# Patient Record
Sex: MALE | Race: WHITE | URBAN - METROPOLITAN AREA
[De-identification: names, ages, dates, MRNs, and addresses within clinical notes are randomized per-mention and may not be internally consistent; named-entity substitution may affect disease eponyms.]

---

## 2019-07-29 ENCOUNTER — HISTORICAL (OUTPATIENT)
Dept: ADMINISTRATIVE | Facility: HOSPITAL | Age: 24
End: 2019-07-29

## 2019-08-08 ENCOUNTER — HISTORICAL (OUTPATIENT)
Dept: SURGERY | Facility: HOSPITAL | Age: 24
End: 2019-08-08

## 2020-03-12 ENCOUNTER — HISTORICAL (OUTPATIENT)
Dept: SURGERY | Facility: HOSPITAL | Age: 25
End: 2020-03-12

## 2022-04-30 NOTE — OP NOTE
DATE OF SURGERY:    08/08/2019    SURGEON:  Valentin Ashford MD    PREOPERATIVE DIAGNOSIS:  Partial amputation, right finger, status post local flap with ischemia and necrosis.    POSTOPERATIVE DIAGNOSIS:  Partial amputation, right finger, status post local flap with ischemia and necrosis.    PROCEDURES:    1. Debridement of nonviable local flap to right distal thumb.  2. Irrigation and debridement, open fracture site right distal phalanx.  3. Revision amputation right thumb.    INDICATIONS FOR PROCEDURE:  Mr. Charan Lyles is a 24-year-old male who presents after he had a table saw injury to his right thumb.  He underwent a local flap with V to Y advancement for coverage.  However, this flap was nonviable and has resulted in necrosis.  He presents for debridement as well as revision amputation, right thumb.    ANESTHESIA:  General.    COMPLICATIONS:  None.    PROCEDURE IN DETAIL:  The patient was endotracheally intubated, prepped and draped in the usual sterile fashion.  The previous flap sutures were removed using tenotomy scissors.  We then debrided the skin and nonviable underlying fascia, approximately 2 x 2 cm.  After this was completed, the fracture site of the distal phalanx was irrigated and debrided out with sterile saline solution.  We then used a rongeur to shorten the bone.  The nail plate, nail bed removed and debrided, and the germinal matrix was removed using a knife and a rongeur.  We then cut the distal skin shorter and revised the amputation stump on his     right thumb.  We closed the volar to dorsal skin using interrupted 3-0 Monocryl, placed sterile dressings.  No complications.  I was scrubbed present for the entire procedure.      ______________________________  Valentin Ashford MD    BF/[facility]  DD:  08/08/2019  Time:  09:24AM  DT:  08/08/2019  Time:  09:37AM  Job #:  [id number]

## 2022-04-30 NOTE — OP NOTE
DATE OF SURGERY:    03/12/2020    SURGEON:  Valentin Ashford MD    PREOPERATIVE DIAGNOSIS:  Residual hook-nail deformity, left thumb.    POSTOPERATIVE DIAGNOSIS:  Residual hook-nail deformity, left thumb.    PROCEDURE:  Ablation left hook-nail deformity, left thumb.    INDICATIONS FOR PROCEDURE:  Charan Lyles is a 25-year-old who suffered a traumatic thumb tip amputation which was eventually healed.  He now has a residual hook-nail deformity and he presents for ablation.    ANESTHESIA:  General.    COMPLICATIONS:  None.    PROCEDURE IN DETAIL:  The patient was placed under LMA anesthesia and prepped and draped in the usual sterile fashion.  Esmarch was used to exsanguinate the left upper extremity.  The tourniquet was inflated to 250 mmHg.  I made an elliptical incision around the residual nail bed.  A rongeur was used to remove the nail as well as the germinal matrix and some of the distal phalanx.  After this was completed, several interrupted nylons were placed in a vertical mattress fashion.  Sterile dressing was applied.  The tourniquet was released.  Fingers were pink at the end of the case.  No complications.  I was scrubbed and present throughout procedure.        ______________________________  Valentin Ashford MD    BF/[facility]  DD:  03/12/2020  Time:  12:04PM  DT:  03/12/2020  Time:  02:55PM  Job #:  [id number]

## 2022-04-30 NOTE — OP NOTE
DATE OF SURGERY:    07/29/2019    SURGEON:  Valentin Ashford MD    PREOPERATIVE DIAGNOSIS:  Partial amputation, right thumb.    POSTOPERATIVE DIAGNOSIS:  Partial amputation, right thumb.    PROCEDURES:    1. V-to-Y Atasoy advancement flap for coverage of volar thumb.  2. Irrigation and debridement, open fracture site, distal phalanx right thumb.    INDICATIONS FOR PROCEDURE:  Mr. Charan Lyles is a 24-year-old who suffered a traumatic partial amputation of the distal aspect of his thumb which transversed through the nail bed.  He presents for V-to-Y advancement flap for closure.    ANESTHESIA:  General.    COMPLICATIONS:  None.    PROCEDURE IN DETAIL:  The patient was placed under LMA anesthesia and prepped and draped in the usual sterile fashion.  Esmarch was used to exsanguinate the right upper extremity, and tourniquet was inflated to 250 mmHg.  We first began by designing a V-to-Y advancement flap on the volar skin.  This is an Atasoy flap.  We made an incision using a 15 blade scalpel down to the subcutaneous tissue.  The flap was elevated on its pedicle underneath using a tenotomy scissors.  It was then advanced over the open bone.  We then irrigated and debrided the open fracture site of the distal phalanx.  After this was completed, the flap was inset using interrupted 3-0 Monocryl, and a V-to-Y closure was carried out.  A sterile dressing was applied.  Tourniquet was released.  Fingers were pink.  No complications.  I was scrubbed and present for the entire procedure.        ______________________________  Valentin Ashford MD    BF/[facility]  DD:  07/29/2019  Time:  08:16AM  DT:  07/29/2019  Time:  08:26AM  Job #:  [id number]